# Patient Record
Sex: MALE | Race: WHITE
[De-identification: names, ages, dates, MRNs, and addresses within clinical notes are randomized per-mention and may not be internally consistent; named-entity substitution may affect disease eponyms.]

---

## 2018-10-25 ENCOUNTER — HOSPITAL ENCOUNTER (OUTPATIENT)
Dept: HOSPITAL 47 - RADECHMAIN | Age: 68
Discharge: HOME | End: 2018-10-25
Attending: INTERNAL MEDICINE
Payer: MEDICARE

## 2018-10-25 DIAGNOSIS — I35.1: Primary | ICD-10-CM

## 2018-10-25 DIAGNOSIS — I51.7: ICD-10-CM

## 2018-10-25 PROCEDURE — 93306 TTE W/DOPPLER COMPLETE: CPT

## 2018-10-25 NOTE — ECHOF
Referral Reason:R60.0 Edema



MEASUREMENTS

--------

HEIGHT: 182.9 cm

WEIGHT: 163.3 kg

BP: 

IVSd:   1.3 cm     (0.6 - 1.1)

LVIDd:   3.4 cm     (3.9 - 5.3)

LVPWd:   1.5 cm     (0.6 - 1.1)

IVSs:   1.5 cm

LVIDs:   1.6 cm

LVPWs:   1.7 cm

Ao Diam:   2.8 cm     (2.0 - 3.7)

LA Diam:   3.8 cm     (2.7 - 3.8)

AV Cusp:   1.4 cm     (1.5 - 2.6)

MV E Theodore:   0.72 m/s

MV DecT:   266 ms

MV A Theodore:   0.68 m/s

MV E/A Ratio:   1.06 

AV maxPG:   15.59 mmHg

AV meanP.98 mmHg

RAP:   5.00 mmHg

RVSP:   10.99 mmHg







FINDINGS

--------

Sinus rhythm.

This was a technically difficult study with suboptimal views.

The left ventricular size is normal.   There is mild concentric left ventricular hypertrophy.   Overa
ll left ventricular systolic function is normal with, an EF between 55 - 60 %.

The right ventricle is normal in size and function.

The left atrium is normal in size.

The right atrium is normal in size.

Aortic valve is trileaflet and is mildly thickened.

There is trace mitral regurgitation.

Trace tricuspid regurgitation present.   The right ventricular systolic pressure, as measured by Dopp
ler, is 10.99mmHg.

Pulmonic valve appears structurally normal.

The aortic root size is normal.

The pericardium is normal.



CONCLUSIONS

--------

1. Sinus rhythm.

2. This was a technically difficult study with suboptimal views.

3. The left ventricular size is normal.

4. There is mild concentric left ventricular hypertrophy.

5. Overall left ventricular systolic function is normal with, an EF between 55 - 60 %.

6. The right ventricle is normal in size and function.

7. The left atrium is normal in size.

8. The right atrium is normal in size.

9. Aortic valve is trileaflet and is mildly thickened.

10. There is trace mitral regurgitation.

11. Trace tricuspid regurgitation present.

12. The right ventricular systolic pressure, as measured by Doppler, is 10.99mmHg.

13. Pulmonic valve appears structurally normal.

14. The aortic root size is normal.

15. The pericardium is normal.





SONOGRAPHER: Eulalia Yin RDCS

## 2021-11-24 ENCOUNTER — HOSPITAL ENCOUNTER (OUTPATIENT)
Dept: HOSPITAL 47 - RADUSWWP | Age: 71
Discharge: HOME | End: 2021-11-24
Attending: THORACIC SURGERY (CARDIOTHORACIC VASCULAR SURGERY)
Payer: MEDICARE

## 2021-11-24 DIAGNOSIS — L97.922: ICD-10-CM

## 2021-11-24 DIAGNOSIS — I89.0: ICD-10-CM

## 2021-11-24 DIAGNOSIS — E66.01: ICD-10-CM

## 2021-11-24 DIAGNOSIS — E08.622: Primary | ICD-10-CM

## 2021-11-24 PROCEDURE — 93923 UPR/LXTR ART STDY 3+ LVLS: CPT
